# Patient Record
Sex: MALE | Race: BLACK OR AFRICAN AMERICAN | NOT HISPANIC OR LATINO | Employment: FULL TIME
[De-identification: names, ages, dates, MRNs, and addresses within clinical notes are randomized per-mention and may not be internally consistent; named-entity substitution may affect disease eponyms.]

---

## 2020-05-13 ENCOUNTER — RESULTS ONLY (OUTPATIENT)
Dept: LAB | Age: 60
End: 2020-05-13

## 2020-05-13 ENCOUNTER — APPOINTMENT (OUTPATIENT)
Dept: URGENT CARE | Facility: URGENT CARE | Age: 60
End: 2020-05-13

## 2020-05-14 LAB
SARS-COV-2 RNA SPEC QL NAA+PROBE: ABNORMAL
SPECIMEN SOURCE: ABNORMAL

## 2020-05-26 ENCOUNTER — RESULTS ONLY (OUTPATIENT)
Dept: LAB | Age: 60
End: 2020-05-26

## 2020-05-26 ENCOUNTER — APPOINTMENT (OUTPATIENT)
Dept: URGENT CARE | Facility: URGENT CARE | Age: 60
End: 2020-05-26

## 2020-05-27 ENCOUNTER — OFFICE VISIT (OUTPATIENT)
Dept: URGENT CARE | Facility: URGENT CARE | Age: 60
End: 2020-05-27
Payer: OTHER GOVERNMENT

## 2020-05-27 DIAGNOSIS — Z20.822 SUSPECTED COVID-19 VIRUS INFECTION: Primary | ICD-10-CM

## 2020-05-27 DIAGNOSIS — Z53.9 ERRONEOUS ENCOUNTER--DISREGARD: ICD-10-CM

## 2020-05-27 LAB
SARS-COV-2 RNA SPEC QL NAA+PROBE: NOT DETECTED
SPECIMEN SOURCE: NORMAL

## 2020-05-27 PROCEDURE — 87635 SARS-COV-2 COVID-19 AMP PRB: CPT | Mod: 90 | Performed by: FAMILY MEDICINE

## 2020-05-27 PROCEDURE — 99000 SPECIMEN HANDLING OFFICE-LAB: CPT | Performed by: FAMILY MEDICINE

## 2020-05-29 ENCOUNTER — TELEPHONE (OUTPATIENT)
Dept: NURSING | Facility: CLINIC | Age: 60
End: 2020-05-29

## 2020-05-29 LAB
SARS-COV-2 RNA SPEC QL NAA+PROBE: ABNORMAL
SPECIMEN SOURCE: ABNORMAL

## 2020-05-30 NOTE — TELEPHONE ENCOUNTER
Coronavirus (COVID-19) Notification    Patient  Moira Castro    Reason for call  Notify of Positive Coronavirus (COVID-19) lab results, assess symptoms,  review [facility] recommendations    Lab Result    Lab test:  2019-nCoV rRt-PCR or SARS-CoV-2 PCR    Oropharyngeal AND/OR nasopharyngeal swabs is POSITIVE for 2019-nCoV RNA/SARS-COV-2 PCR (COVID-19 virus)    RN Recommendations/Instructions per [facility] Coronavirus COVID-19 recommendations    Brief introduction script  Hi, My name is Carmen and I am calling on behalf of [facility].  We were notified that your Coronavirus test (COVID-19) for was POSITIVE for the virus.  I have some information to relay to you but first I wanted to mention that the [place] Dept of Health will be contacting you shortly [it's possible MD already called Patient] to talk to you more about how you are feeling and other people you have had contact with who might now also have the virus.  Also, [facility] is Partnering with the [facility] for Covid-19 research, you may be contacted directly by research staff.    Assessment (Inquire about Patient's current symptoms)  No symptoms. Patient was also tested positive on 5/13/20 and has been self isolating since then. Had a negative PCR on 5/26/20 and now positive PCR on 5/27/20.   Patient will contact Employee Health for further direction, is a [employer].     If at time of call, Patients symptoms hare worsened, the Patient should contact 911 or have someone drive them to Emergency Dept promptly:      If Patient calling 911, inform 911 personal that you have tested positive for the Coronavirus (COVID-19).  Place mask on and await 911 to arrive.    If Emergency Dept, If possible, please have another adult drive you to the Emergency Dept but you need to wear mask when in contact with other people.      Review information with Patient    Since you tested POSITIVE for the COVID-19 virus, it is  important that you protect others from being exposed and infected with this virus.    [For safety, it's very important to follow these rules.]    First, stay home and away from others (self-isolate) until:    You've had no fever--and no medicine that reduces fever--for 3 full days (72 hours). And      Your other symptoms have gotten better. For example, your cough or breathing has improved. And     At least 10 days have passed since your symptoms started.    [For Asymptomatic Patients] -  If Patient did not have any symptoms when tested or any symptoms since tested, they should self isolate for at least 10 days since their COVID19 PCR test date (date test collected).  New 5/27/20    During this time:    Stay in your own room (and use your own bathroom), if you can.    Stay away from others in your home. No hugging, kissing or shaking hands.    Don't let anyone visit.    Don't go to work, school or anywhere else.     Clean  high touch  surfaces often (doorknobs, counters, handles, etc.). Use a household cleaning spray or wipes.    Cover your mouth and nose with a mask, tissue or washcloth to avoid spreading germs.    Wash your hands and face often with soap and water.    You should not go back to work until you meet the guidelines above for ending your home isolation. You should meet these along with any other guidelines that your employer has.  Employers: This document serves as formal notice of your employee's medical guidelines for going back to work. They must meet the above guidelines before going back to work in person.      How can I take care of myself?  1. Get lots of rest. Drink extra fluids (unless a doctor has told you not to).    2. Take Tylenol (acetaminophen) for fever or pain. If you have liver or kidney problems, ask your family doctor if it's okay to take Tylenol.     Take either:     650 mg (two 325 mg pills) every 4 to 6 hours, or     1,000 mg (two 500 mg pills) every 8 hours as needed.     Note:  Don't take more than 3,000 mg in one day. Acetaminophen is found in many medicines (both prescribed and over-the-counter medicines). Read all labels to be sure you don't take too much.  For children, check the Tylenol bottle for the right dose. The dose is based on the child's age or weight.  3. If you have other health problems (like cancer, heart failure, an organ transplant or severe kidney disease): Call your specialty clinic if you don't feel better in the next 2 days.    4. Know when to call 911: If your breathing is so bad that it keeps you from doing normal activities, call 911 or go to the emergency room. Tell them that you've been staying home and may have COVID-19.    5. Sign up for [employer]. We know it's scary to hear that you have COVID-19. We want to track your symptoms to make sure you're okay over the next 2 weeks. Please look for an email from [employer]--this is a free, online program that we'll use to keep in touch. To sign up, follow the link in the email. Learn more at [email or web address].      Where can I get more information?    To learn the [place]'s guidelines for staying home, please visit the [place] Department of Health website at https://www.health.state.[place]/diseases/coronavirus/basics.html.    To learn more about COVID-19 and how to care for yourself at home, please visit the CDC website at https://www.cdc.gov/coronavirus/2019-ncov/about/steps-when-sick.html.    For more options for care at [facility], please visit our website at [email or web address]      [place] Dept of Health ([facility]) COVID-19 Hotline:   110.400.9778    Positive COVID-19 letter sent (Yes/No):  Yes    [Name]  Lynette Nissen RN

## 2020-05-30 NOTE — TELEPHONE ENCOUNTER
Coronavirus (COVID-19) Notification    Patient  Moira Castro    7:56 pm - no answer  8:00 pm - left generic message; Return call to [phone] between 7 am - 6:30 pm    Reason for call  Notify of Positive Coronavirus (COVID-19) lab results, assess symptoms,  review [facility] recommendations    Lab Result    Lab test:  2019-nCoV rRt-PCR or SARS-CoV-2 PCR    Oropharyngeal AND/OR nasopharyngeal swabs is POSITIVE for 2019-nCoV RNA/SARS-COV-2 PCR (COVID-19 virus)    RN Recommendations/Instructions per [facility] Coronavirus COVID-19 recommendations    Brief introduction script  Hi, My name is SIENNA Gomez and I am calling on behalf of [employer].  We were notified that your Coronavirus test (COVID-19) for was POSITIVE for the virus.  I have some information to relay to you but first I wanted to mention that the [place] Dept of Health will be contacting you shortly [it's possible MD already called Patient] to talk to you more about how you are feeling and other people you have had contact with who might now also have the virus.  Also, [facility] is Partnering with the [facility] for Covid-19 research, you may be contacted directly by research staff.    Assessment (Inquire about Patient's current symptoms)  VM left to return call    If at time of call, Patients symptoms hare worsened, the Patient should contact 911 or have someone drive them to Emergency Dept promptly:      If Patient calling 911, inform 911 personal that you have tested positive for the Coronavirus (COVID-19).  Place mask on and await 911 to arrive.    If Emergency Dept, If possible, please have another adult drive you to the Emergency Dept but you need to wear mask when in contact with other people.      Review information with Patient    Since you tested POSITIVE for the COVID-19 virus, it is important that you protect others from being exposed and infected with this virus.    [For safety, it's very important to follow  these rules.]    First, stay home and away from others (self-isolate) until:    You've had no fever--and no medicine that reduces fever--for 3 full days (72 hours). And      Your other symptoms have gotten better. For example, your cough or breathing has improved. And     At least 10 days have passed since your symptoms started.    [For Asymptomatic Patients] -  If Patient did not have any symptoms when tested or any symptoms since tested, they should self isolate for at least 10 days since their COVID19 PCR test date (date test collected).  New 5/27/20    During this time:    Stay in your own room (and use your own bathroom), if you can.    Stay away from others in your home. No hugging, kissing or shaking hands.    Don't let anyone visit.    Don't go to work, school or anywhere else.     Clean  high touch  surfaces often (doorknobs, counters, handles, etc.). Use a household cleaning spray or wipes.    Cover your mouth and nose with a mask, tissue or washcloth to avoid spreading germs.    Wash your hands and face often with soap and water.    You should not go back to work until you meet the guidelines above for ending your home isolation. You should meet these along with any other guidelines that your employer has.  Employers: This document serves as formal notice of your employee's medical guidelines for going back to work. They must meet the above guidelines before going back to work in person.      How can I take care of myself?  1. Get lots of rest. Drink extra fluids (unless a doctor has told you not to).    2. Take Tylenol (acetaminophen) for fever or pain. If you have liver or kidney problems, ask your family doctor if it's okay to take Tylenol.     Take either:     650 mg (two 325 mg pills) every 4 to 6 hours, or     1,000 mg (two 500 mg pills) every 8 hours as needed.     Note: Don't take more than 3,000 mg in one day. Acetaminophen is found in many medicines (both prescribed and over-the-counter  medicines). Read all labels to be sure you don't take too much.  For children, check the Tylenol bottle for the right dose. The dose is based on the child's age or weight.  3. If you have other health problems (like cancer, heart failure, an organ transplant or severe kidney disease): Call your specialty clinic if you don't feel better in the next 2 days.    4. Know when to call 911: If your breathing is so bad that it keeps you from doing normal activities, call 911 or go to the emergency room. Tell them that you've been staying home and may have COVID-19.    5. Sign up for [employer]. We know it's scary to hear that you have COVID-19. We want to track your symptoms to make sure you're okay over the next 2 weeks. Please look for an email from [employer]--this is a free, online program that we'll use to keep in touch. To sign up, follow the link in the email. Learn more at [email or web address].      Where can I get more information?    To learn the [place]'s guidelines for staying home, please visit the [place] Department of Health website at https://www.health.[facility].[place]/diseases/coronavirus/basics.html.    To learn more about COVID-19 and how to care for yourself at home, please visit the CDC website at https://www.cdc.gov/coronavirus/2019-ncov/about/steps-when-sick.html.    For more options for care at [facility], please visit our website at [email or web address]      [place] Dept of Health ([facility]) COVID-19 Hotline:   [phone]    Positive COVID-19 letter sent (Yes/No):  NO    [Name]  Itzel Irizarry RN  [facility]

## 2021-08-01 ENCOUNTER — OFFICE VISIT (OUTPATIENT)
Dept: URGENT CARE | Facility: URGENT CARE | Age: 61
End: 2021-08-01
Payer: OTHER GOVERNMENT

## 2021-08-01 VITALS
OXYGEN SATURATION: 100 % | DIASTOLIC BLOOD PRESSURE: 71 MMHG | TEMPERATURE: 98.4 F | HEART RATE: 75 BPM | RESPIRATION RATE: 18 BRPM | WEIGHT: 203.3 LBS | SYSTOLIC BLOOD PRESSURE: 116 MMHG

## 2021-08-01 DIAGNOSIS — R09.81 NASAL CONGESTION: ICD-10-CM

## 2021-08-01 DIAGNOSIS — J98.8 VIRAL RESPIRATORY ILLNESS: Primary | ICD-10-CM

## 2021-08-01 DIAGNOSIS — R05.9 COUGH: ICD-10-CM

## 2021-08-01 DIAGNOSIS — B97.89 VIRAL RESPIRATORY ILLNESS: Primary | ICD-10-CM

## 2021-08-01 LAB — SARS-COV-2 RNA RESP QL NAA+PROBE: NEGATIVE

## 2021-08-01 PROCEDURE — 99203 OFFICE O/P NEW LOW 30 MIN: CPT | Performed by: PHYSICIAN ASSISTANT

## 2021-08-01 PROCEDURE — U0003 INFECTIOUS AGENT DETECTION BY NUCLEIC ACID (DNA OR RNA); SEVERE ACUTE RESPIRATORY SYNDROME CORONAVIRUS 2 (SARS-COV-2) (CORONAVIRUS DISEASE [COVID-19]), AMPLIFIED PROBE TECHNIQUE, MAKING USE OF HIGH THROUGHPUT TECHNOLOGIES AS DESCRIBED BY CMS-2020-01-R: HCPCS | Performed by: PHYSICIAN ASSISTANT

## 2021-08-01 PROCEDURE — U0005 INFEC AGEN DETEC AMPLI PROBE: HCPCS | Performed by: PHYSICIAN ASSISTANT

## 2021-08-01 RX ORDER — CETIRIZINE HYDROCHLORIDE 10 MG/1
10 TABLET ORAL DAILY
Qty: 30 TABLET | Refills: 0 | Status: SHIPPED | OUTPATIENT
Start: 2021-08-01 | End: 2021-08-11

## 2021-08-01 NOTE — PROGRESS NOTES
Differential Diagnosis:  URI Adult/Peds:  Acute right otitis media, Acute left otitis media, Allergic rhinitis, Sinusitis and Viral upper respiratory illness          ASSESSMENT:     ICD-10-CM    1. Viral respiratory illness  J98.8     B97.89    2. Cough  R05 cetirizine (ZYRTEC) 10 MG tablet     Symptomatic COVID-19 Virus (Coronavirus) by PCR Nasopharyngeal   3. Nasal congestion  R09.81 cetirizine (ZYRTEC) 10 MG tablet     Symptomatic COVID-19 Virus (Coronavirus) by PCR Nasopharyngeal           PLAN: Likely viral respiratory illness.  Covid test is pending with increase of delta variant.  Also symptoms could be due to poor air quality from the [place] fires.  Zyrtec.  Off work today and tomorrow and may return Tuesday if Covid test is negative and afebrile.   I have discussed clinical findings with patient.  Side effects of medications discussed.  Symptomatic care is discussed.  I have discussed the possibility of  worsening symptoms and indication to RTC or go to the ER if they occur.  All questions are answered, patient indicates understanding of these issues and is in agreement with plan.   Patient care instructions are discussed/given at the end of visit.   Lots of rest and fluids.      Brandi Hall PA-C      SUBJECTIVE:  60-year-old male presents for cough, nasal congestion eye tearing for 3 days.  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  States his axillary temp last night at home was 104.  Has not had any ibuprofen for over 24 hours.  No vomiting or diarrhea.  No rash.  Needs a note to be off work today and tomorrow and will return on Tuesday.    Positive covid 5/27/2020  Covid vacc 3/2021          ROS:  CONSTITUTIONAL: Negative for fatigue or fever.  EYES: Negative for eye problems.  ENT: As above.  RESP: As above.  CV: Negative for chest pains.  GI: Negative for vomiting.  MUSCULOSKELETAL:  Negative for significant muscle or joint pains.  NEUROLOGIC:  Negative for headaches.  SKIN: Negative for rash.  PSYCH: Normal mentation for age.    OBJECTIVE:  /71   Pulse 75   Temp 98.4  F (36.9  C) (Oral)   Resp 18   Wt 92.2 kg (203 lb 4.8 oz)   SpO2 100%   GENERAL APPEARANCE: Healthy, alert and no distress.  EYES:Conjunctiva/sclera clear.  EARS: No cerumen.   Ear canals w/o erythema.  TM's intact w/o erythema.    NOSE/MOUTH: Nose without ulcers, erythema or lesions.  SINUSES: No maxillary sinus tenderness.  THROAT: No erythema w/o tonsillar enlargement . No exudates.  NECK: Supple, nontender, no lymphadenopathy.  RESP: Lungs clear to auscultation - no rales, rhonchi or wheezes  CV: Regular rate and rhythm, normal S1 S2, no murmur noted.  NEURO: Awake, alert    SKIN: No rashes        Brandi Hall PA-C

## 2021-08-01 NOTE — LETTER
[facility] [address] [address]  Phone: [phone]    August 1, 2021        Moira Castro  [address] [address]          To whom it may concern:    RE: Moira Castro    Patient was seen and treated today at our clinic. Off work 8/1 and 8/2/2021. May return to work if no fever and covid test is negative on 8/3/2021.    Please contact me for questions or concerns.      Sincerely,        Brandi Hall PA-C

## 2021-08-03 ENCOUNTER — TELEPHONE (OUTPATIENT)
Dept: FAMILY MEDICINE | Facility: CLINIC | Age: 61
End: 2021-08-03

## 2021-08-03 NOTE — TELEPHONE ENCOUNTER
: Pt requesting a copy of the 8/2/21 letter. Pt states he has not received the letter yet and would like to  from the [facility]  today    Pt requesting 8/1/21 COVID test results. RN relayed 8/2/21 provider result message to pt. RN reviewed the 8/2/21 letter with pt and pt states he will give the letter to his provider to determine when he can return to work. Pt confirms he still feels like he has chills today and is aware he should continue to isolate until fever and chills free for 24 hours and meets other criteria as outlined in the letter.      Nita Weller CMA   8/3/2021 10:01 AM CDT Back to Top      Phone number not in service. Letter was sent yesterday.     Brandi Hall PA-C   8/2/2021  7:28 AM CDT       Let him know his covid test is negative so he can go back to work on Tuesday.  Please notify.     RIMA PepperN, RN

## 2021-08-03 NOTE — TELEPHONE ENCOUNTER
"Printed letter and bringing to the  by 3:30 today, 8/3/2021.  Called and Patient # \"Not in Service\"  [facility]  2nd Floor  Primary Care    "

## 2021-09-13 ENCOUNTER — VIRTUAL VISIT (OUTPATIENT)
Dept: FAMILY MEDICINE | Facility: CLINIC | Age: 61
End: 2021-09-13

## 2021-09-13 DIAGNOSIS — R76.11 MANTOUX: POSITIVE: Primary | ICD-10-CM

## 2021-09-13 PROCEDURE — 99213 OFFICE O/P EST LOW 20 MIN: CPT | Mod: 95 | Performed by: INTERNAL MEDICINE

## 2021-09-13 NOTE — PATIENT INSTRUCTIONS
Patient Education     Tuberculosis Testing    Tuberculosis (TB) is a bacterial disease that spreads through the air. A person with TB of the throat or lungs who coughs, speaks, or sings can unknowingly spread the bacteria into the air. Uninfected people nearby can breathe in the TB bacteria and be infected.  TB can cause serious health problems. To protect your health, get tested.  Important  If you have active TB disease or simply test positive for TB infection, you must see a healthcare provider for an exam and treatment.   Who should be tested?  Anyone can be exposed to TB. But healthcare providers, homeless people, and people coming from countries with high TB rates are more likely to be exposed to it. Older adults and people with HIV and AIDS are less able to fight off infections. They are also more likely to get TB. If you re at risk for exposure, get tested regularly.  TB tests    Skin test. The TB skin test tells you if the TB bacteria are in your body. A small amount of solution is put under the skin with a needle. This is done to see if a reaction occurs, such as a hard, red bump.    Blood test. A small amount of blood is drawn and sent to a lab for testing.    Other tests. If you are being evaluated for active TB, a sample may be taken of the mucus that comes up when you cough (sputum). Or some other tissue or body fluid sample may be taken. It is then tested for TB.  Getting your TB test results  After the placement of a TB skin test, 2 to 3 days later you ll need to return to the office to get the test read. Be sure to keep this appointment. In some cases, a second test may be done to confirm results. If you have a blood test, the results are often available in a week. Samples of sputum and other body tissues and fluids can take up to 6 weeks for final results.  What do the test results mean    Negative results. These mean you likely don t have the TB bacteria in your body. But in people with some  chronic illnesses, the TB tests can be negative even if there is TB in the body.    Positive results. These mean that you may have been infected with the TB bacteria. This doesn t necessarily mean you have active TB disease. You will need more tests, such as chest X-rays, to find out if you have active TB disease. If you don't have active TB, then you are said to have latent TB infection.   [employer] last reviewed this educational content on 6/1/2019 2000-2021 [employer]. All rights reserved. This information is not intended as a substitute for professional medical care. Always follow your healthcare professional's instructions.

## 2021-09-13 NOTE — PROGRESS NOTES
Moira is a 61 year old who is being evaluated via a billable telephone visit.      What phone number would you like to be contacted at? [phone]  How would you like to obtain your AVS? 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    Toni Ulrich MD  [facility]   Moira is a 61 year old who presents for the following health issues     HPI   He has a history of having positive Mantoux test in the past  He is going for a new job  Needs to have a chest x-ray before starting the job          Review of Systems   Constitutional, HEENT, cardiovascular, pulmonary, gi and gu systems are negative, except as otherwise noted.      Objective           Vitals:  No vitals were obtained today due to virtual visit.    Physical Exam   healthy, alert and no distress  PSYCH: Alert and oriented times 3; coherent speech, normal   rate and volume, able to articulate logical thoughts, able   to abstract reason, no tangential thoughts, no hallucinations   or delusions  His affect is normal  RESP: No cough, no audible wheezing, able to talk in full sentences  Remainder of exam unable to be completed due to telephone visits                Phone call duration: 5 minutes

## 2021-09-14 ENCOUNTER — ANCILLARY PROCEDURE (OUTPATIENT)
Dept: GENERAL RADIOLOGY | Facility: CLINIC | Age: 61
End: 2021-09-14
Attending: INTERNAL MEDICINE

## 2021-09-14 DIAGNOSIS — R76.11 MANTOUX: POSITIVE: ICD-10-CM

## 2021-09-14 PROCEDURE — 71046 X-RAY EXAM CHEST 2 VIEWS: CPT | Performed by: RADIOLOGY

## 2021-09-14 NOTE — LETTER
September 16, 2021      Moira Castro  [address] [address]      Mr. Castro,     Your Chest Xray is normal     Please contact the clinic if you have additional questions.  Thank you.     Sincerely,     Toni Ulrich MD         Resulted Orders   XR Chest 2 Views    Narrative    CHEST TWO VIEWS  9/14/2021 7:07 AM     HISTORY: 61-year-old patient with Mantoux positive test.    COMPARISON: None.       Impression    IMPRESSION: Heart size is normal. No pleural effusion, pneumothorax,  or abnormal area of consolidation.    RUSS HARRIS MD         SYSTEM ID:  [id number]

## 2022-05-25 ENCOUNTER — OFFICE VISIT (OUTPATIENT)
Dept: URGENT CARE | Facility: URGENT CARE | Age: 62
End: 2022-05-25

## 2022-05-25 VITALS
DIASTOLIC BLOOD PRESSURE: 79 MMHG | SYSTOLIC BLOOD PRESSURE: 144 MMHG | BODY MASS INDEX: 28.77 KG/M2 | HEART RATE: 87 BPM | OXYGEN SATURATION: 100 % | WEIGHT: 201 LBS | TEMPERATURE: 97.6 F | HEIGHT: 70 IN

## 2022-05-25 DIAGNOSIS — N52.9 ERECTILE DYSFUNCTION, UNSPECIFIED ERECTILE DYSFUNCTION TYPE: Primary | ICD-10-CM

## 2022-05-25 PROCEDURE — 99213 OFFICE O/P EST LOW 20 MIN: CPT | Performed by: NURSE PRACTITIONER

## 2022-05-25 NOTE — PATIENT INSTRUCTIONS
Please follow up with primary care for a physical and to establish care please ask for a longer appointment (40 minutes) and go to that appointment fasting (no food for 12 hours and can have water or black coffee)

## 2022-05-25 NOTE — PROGRESS NOTES
"Assessment & Plan     1. Erectile dysfunction, unspecified erectile dysfunction type  Discussed with patient recommendation to follow-up with primary care provider he will need further work-up in regards to causes of erectile dysfunction discussed cardiovascular work-up would be needed as well as possible testosterone level checkup and mental health exam patient verbalized understanding he will set up an appointment as he leaves clinic today for physical and establish care.    Patient Instructions   Please follow up with primary care for a physical and to establish care please ask for a longer appointment (40 minutes) and go to that appointment fasting (no food for 12 hours and can have water or black coffee)       MAGALY Stahl [facility] CARE JOSHUA Pizarro is a 61 year old male who presents to clinic today for the following health issues:  Chief Complaint   Patient presents with     Erectile Dysfunction     HPI    Patient with approximately 2-month history of erectile dysfunction with mild elevation of blood pressure today.  Patient states that he has negative history of cardiovascular disease however there is no lab work-up in epic unable to go over this with him.  He states he has been  for many years and feels that his libido has been low.      Review of Systems  Constitutional, HEENT, cardiovascular, pulmonary, gi and gu systems are negative, except as otherwise noted.      Objective    BP (!) 144/79 (BP Location: Left arm, Patient Position: Sitting, Cuff Size: Adult Regular)   Pulse 87   Temp 97.6  F (36.4  C) (Tympanic)   Ht 1.778 m (5' 10\")   Wt 91.2 kg (201 lb)   SpO2 100%   BMI 28.84 kg/m    Physical Exam   GENERAL: healthy, alert and no distress  NECK: no adenopathy, no asymmetry, masses, or scars and thyroid normal to palpation  RESP: lungs clear to auscultation - no rales, rhonchi or wheezes  CV: regular rate and rhythm, normal S1 S2, " no S3 or S4, no murmur, click or rub, no peripheral edema and peripheral pulses strong  ABDOMEN: soft, nontender, no hepatosplenomegaly, no masses and bowel sounds normal  MS: no gross musculoskeletal defects noted, no edema

## 2022-08-14 ENCOUNTER — OFFICE VISIT (OUTPATIENT)
Dept: URGENT CARE | Facility: URGENT CARE | Age: 62
End: 2022-08-14

## 2022-08-14 VITALS
OXYGEN SATURATION: 99 % | HEART RATE: 95 BPM | BODY MASS INDEX: 29.06 KG/M2 | TEMPERATURE: 97.3 F | WEIGHT: 203 LBS | DIASTOLIC BLOOD PRESSURE: 78 MMHG | HEIGHT: 70 IN | SYSTOLIC BLOOD PRESSURE: 120 MMHG

## 2022-08-14 DIAGNOSIS — Z20.822 SUSPECTED 2019 NOVEL CORONAVIRUS INFECTION: ICD-10-CM

## 2022-08-14 DIAGNOSIS — J02.9 SORE THROAT: Primary | ICD-10-CM

## 2022-08-14 DIAGNOSIS — R05.9 COUGH: ICD-10-CM

## 2022-08-14 LAB
DEPRECATED S PYO AG THROAT QL EIA: NEGATIVE
GROUP A STREP BY PCR: NOT DETECTED
SARS-COV-2 RNA RESP QL NAA+PROBE: POSITIVE

## 2022-08-14 PROCEDURE — U0005 INFEC AGEN DETEC AMPLI PROBE: HCPCS | Performed by: PHYSICIAN ASSISTANT

## 2022-08-14 PROCEDURE — 87651 STREP A DNA AMP PROBE: CPT | Performed by: PHYSICIAN ASSISTANT

## 2022-08-14 PROCEDURE — U0003 INFECTIOUS AGENT DETECTION BY NUCLEIC ACID (DNA OR RNA); SEVERE ACUTE RESPIRATORY SYNDROME CORONAVIRUS 2 (SARS-COV-2) (CORONAVIRUS DISEASE [COVID-19]), AMPLIFIED PROBE TECHNIQUE, MAKING USE OF HIGH THROUGHPUT TECHNOLOGIES AS DESCRIBED BY CMS-2020-01-R: HCPCS | Performed by: PHYSICIAN ASSISTANT

## 2022-08-14 PROCEDURE — 99214 OFFICE O/P EST MOD 30 MIN: CPT | Mod: CS | Performed by: PHYSICIAN ASSISTANT

## 2022-08-14 RX ORDER — IBUPROFEN 200 MG
200 TABLET ORAL EVERY 4 HOURS PRN
COMMUNITY

## 2022-08-14 RX ORDER — NAPROXEN 500 MG/1
500 TABLET ORAL 2 TIMES DAILY WITH MEALS
Qty: 20 TABLET | Refills: 0 | Status: SHIPPED | OUTPATIENT
Start: 2022-08-14 | End: 2022-08-24

## 2022-08-14 RX ORDER — BENZONATATE 100 MG/1
100 CAPSULE ORAL 3 TIMES DAILY PRN
Qty: 30 CAPSULE | Refills: 0 | Status: SHIPPED | OUTPATIENT
Start: 2022-08-14 | End: 2022-08-24

## 2022-08-14 NOTE — PROGRESS NOTES
Chief Complaint   Patient presents with     Pharyngitis     Cough       Results for orders placed or performed in visit on 08/14/22   Streptococcus A Rapid Screen w/Reflex to PCR - Clinic Collect     Status: Normal    Specimen: Throat; Swab   Result Value Ref Range    Group A Strep antigen Negative Negative               ASSESSMENT:     ICD-10-CM    1. Sore throat  J02.9 ibuprofen (ADVIL/MOTRIN) 200 MG tablet     Streptococcus A Rapid Screen w/Reflex to PCR - Clinic Collect     Symptomatic; Unknown COVID-19 Virus (Coronavirus) by PCR Nose     Group A Streptococcus PCR Throat Swab     benzonatate (TESSALON) 100 MG capsule     naproxen (NAPROSYN) 500 MG tablet   2. Cough  R05.9 ibuprofen (ADVIL/MOTRIN) 200 MG tablet     Streptococcus A Rapid Screen w/Reflex to PCR - Clinic Collect     Symptomatic; Unknown COVID-19 Virus (Coronavirus) by PCR Nose     Group A Streptococcus PCR Throat Swab     benzonatate (TESSALON) 100 MG capsule     naproxen (NAPROSYN) 500 MG tablet   3. Suspected 2019 novel coronavirus infection  Z20.822 ibuprofen (ADVIL/MOTRIN) 200 MG tablet     Streptococcus A Rapid Screen w/Reflex to PCR - Clinic Collect     Symptomatic; Unknown COVID-19 Virus (Coronavirus) by PCR Nose     Group A Streptococcus PCR Throat Swab     benzonatate (TESSALON) 100 MG capsule     naproxen (NAPROSYN) 500 MG tablet           PLAN: Vital signs stable.  Tessalon Perles.  Naproxen.  Further strep testing COVID test pending.  I have discussed clinical findings with patient.  Side effects of medications discussed.  Symptomatic care is discussed.  I have discussed the possibility of  worsening symptoms and indication to RTC or go to the ER if they occur.  All questions are answered, patient indicates understanding of these issues and is in agreement with plan.   Patient care instructions are discussed/given at the end of visit.   Lots of rest and fluids.      Brandi Hall PA-C      SUBJECTIVE:  61-year-old male presents  "for evaluation of sore throat and dry cough for 3 days.  Sore throat bothers him the most.  No fever.  No history of asthma or seasonal allergies.  No vomiting or diarrhea.  covid vacc x3    No Known Allergies    No past medical history on file.    ibuprofen (ADVIL/MOTRIN) 200 MG tablet, Take 200 mg by mouth every 4 hours as needed for mild pain    No current facility-administered medications on file prior to visit.      Social History     Tobacco Use     Smoking status: Never Smoker     Smokeless tobacco: Never Used   Substance Use Topics     Alcohol use: Yes       ROS:  CONSTITUTIONAL: Negative for fatigue or fever.  EYES: Negative for eye problems.  ENT: As above.  RESP: As above.  CV: Negative for chest pains.  GI: Negative for vomiting.  MUSCULOSKELETAL:  Negative for significant muscle or joint pains.  NEUROLOGIC: Negative for headaches.  SKIN: Negative for rash.  PSYCH: Normal mentation for age.    OBJECTIVE:  /78 (BP Location: Left arm, Patient Position: Sitting, Cuff Size: Adult Large)   Pulse 95   Temp 97.3  F (36.3  C) (Tympanic)   Ht 1.778 m (5' 10\")   Wt 92.1 kg (203 lb)   SpO2 99%   BMI 29.13 kg/m    GENERAL APPEARANCE: Healthy, alert and no distress.  EYES:Conjunctiva/sclera clear.  EARS: No cerumen.   Ear canals w/o erythema.  TM's intact w/o erythema.    NOSE/MOUTH: Nose without ulcers, erythema or lesions.  SINUSES: No maxillary sinus tenderness.  THROAT: Moderate erythema w/o tonsillar enlargement . No exudates.  NECK: Supple, nontender, no lymphadenopathy.  RESP: Lungs clear to auscultation - no rales, rhonchi or wheezes.  Uvula midline.  CV: Regular rate and rhythm, normal S1 S2, no murmur noted.  NEURO: Awake, alert    SKIN: No rashes        Brandi Hall PA-C    "

## 2022-08-15 ENCOUNTER — NURSE TRIAGE (OUTPATIENT)
Dept: NURSING | Facility: CLINIC | Age: 62
End: 2022-08-15

## 2022-08-15 ENCOUNTER — TELEPHONE (OUTPATIENT)
Dept: NURSING | Facility: CLINIC | Age: 62
End: 2022-08-15

## 2022-08-15 NOTE — TELEPHONE ENCOUNTER
Patient returned call to [facility] as was seen in this office for UC yesterday and where had test performed.  Patient asking for COVID results.    Writer relayed to patient that result is POSITIVE.  Discussed home care and isolation/quaratine guidelines. Reviewed current CDC guidelines with patient.  Recommended patient schedule treatment visit ASAP as is symptomatic and still within prescription window.  Patient agreed with this plan, scheduling number given ([phone], opt 7)  Not experiencing severe or concerning symptoms at this time.  Reviewed symptoms that warrant going to ED immediately or calling 911.      Miriam Howell RN  [facility]  Primary Care Department

## 2022-08-15 NOTE — TELEPHONE ENCOUNTER
Patient classified as COVID treatment eligible by Epic high risk algorithm:  yes    Coronavirus (COVID-19) Notification    Reason for call  Notify of POSITIVE COVID-19 lab result, assess symptoms,  review [facility] recommendations    Lab Result   Lab test for 2019-nCoV rRt-PCR or SARS-COV-2 PCR  Oropharyngeal AND/OR nasopharyngeal swabs were POSITIVE for 2019-nCoV RNA [OR] SARS-COV-2 RNA (COVID-19) RNA     We have been unable to reach patient by phone at this time to notify of their Positive COVID-19 result.    Left voicemail message requesting a call back to [phone] [facility] for results.        A Positive COVID-19 letter will be sent via [employer] or the mail.    Philomena Nina

## 2022-08-15 NOTE — TELEPHONE ENCOUNTER
"RN Triage:  Patient was seen in  8/14/22  COVID-19 positive.  Pt was advised to contact [facility] to scheduled treatment today.  Pt was not experiencing severe or concerning symptoms at this time per RN Felicia Soto.      Coronavirus (COVID-19) Notification    Moira Castro,      Lab Result    Lab test:  2019-nCoV rRt-PCR or SARS-CoV-2 PCR    Oropharyngeal AND/OR nasopharyngeal swabs is POSITIVE for 2019-nCoV RNA/SARS-COV-2 PCR (COVID-19 virus)    Brief introduction script  Introduce self then review script:  \"I am calling on behalf of [facility].  We were notified that your Coronavirus test (COVID-19) for was POSITIVE for the virus.\"    Gather patient reported symptoms   Assessment   Current Symptoms at time of phone call, reported by patient: (if no symptoms, document No symptoms] Runny nose and congestion.  Sore throat.  Fever resolved presently.   Date of Symptom(s) onset (if applicable) 8/12/22     If at time of call, Patients symptoms hare worsened, the Patient should contact 911 or have someone drive them to Emergency Dept promptly:      If Patient calling 911, inform 911 personal that you have tested positive for the Coronavirus (COVID-19).  Place mask on and await 911 to arrive.    If Emergency Dept, If possible, please have another adult drive you to the Emergency Dept but you need to wear mask when in contact with other people.      Monoclonal Antibody Administration    You may be eligible to receive a new treatment with a monoclonal antibody for preventing hospitalization in patients at high risk for complications from COVID-19. This medication is still experimental and available on a limited basis; it is given through an IV and must be given at an infusion center. Please note that not all people who are eligible will receive the medication since it is in limited supply.  Is the patient symptomatic and onset of symptoms within the last 7 days?  Yes  Is the patient interested in a visit " with a provider to discuss treatment options?: Yes  Is the patient seen at [facility]?  No: Warm transfer caller to [phone] to be scheduled with a virtual urgent provider.  During transfer, instruct  on appropriate time frame for visit     Review information with Patient    Your result was positive. This means you have COVID-19 (coronavirus).      How can I protect others?    These guidelines are for isolating before returning to work, school or .       If you DO have symptoms:  o Stay home and away from others  - For at least 5 days after your symptoms started, AND   - You are fever free for 24 hours (with no medicine that reduces fever), AND  - Your other symptoms are better.  o Wear a mask for 10 full days any time you are around others.    If you DON'T have symptoms:  o Stay at home and away from others for at least 5 days after your positive test.  o Wear a mask for 10 full days any time you are around others.    There may be different guidelines for healthcare facilities. Please check with the specific sites before arriving.     If you've been told by a doctor that you were severely ill with COVID-19 or are immunocompromised, you should isolate for at least 10 days.    You should not go back to work until you meet the guidelines above for ending your home isolation. You don't need to be retested for COVID-19 before going back to work--studies show that you won't spread the virus if it's been at least 10 days since your symptoms started (or 20 days, if you have a weak immune system).    Employers, schools, and daycares: This is an official notice for this person's medical guidelines for returning in-person. They must meet the above guidelines before going back to work, school, or  in person.    You will receive a positive COVID-19 letter via [employer] or the mail soon with additional self-care information.      Would you like me to review some of that information with you  now?  Yes    How can I take care of myself?      Get lots of rest. Drink extra fluids (unless a doctor has told you not to).      Take Tylenol (acetaminophen) for fever or pain. If you have liver or kidney problems, ask your family doctor if it's okay to take Tylenol.     Take either:     650 mg (two 325 mg pills) every 4 to 6 hours, or     1,000 mg (two 500 mg pills) every 8 hours as needed.     Note: Do not take more than 3,000 mg in one day. Acetaminophen is found in many medicines (both prescribed and over-the-counter medicines). Read all labels to be sure you don't take too much.    For children, check the Tylenol bottle for the right dose (based on their age or weight).      If you have other health problems (like cancer, heart failure, an organ transplant or severe kidney disease): Call your specialty clinic if you don't feel better in the next 2 days.      Know when to call 911: Emergency warning signs include:    Trouble breathing or shortness of breath    Pain or pressure in the chest that doesn't go away    Feeling confused like you haven't felt before, or not being able to wake up    Bluish-colored lips or face        If you were tested for an upcoming procedure, please contact your provider for next steps.     Pat Rodas RN

## 2022-08-16 ENCOUNTER — VIRTUAL VISIT (OUTPATIENT)
Dept: URGENT CARE | Facility: CLINIC | Age: 62
End: 2022-08-16

## 2022-08-16 DIAGNOSIS — R05.9 COUGH: Primary | ICD-10-CM

## 2022-08-16 DIAGNOSIS — U07.1 COVID: ICD-10-CM

## 2022-08-16 PROCEDURE — 99212 OFFICE O/P EST SF 10 MIN: CPT | Mod: CS | Performed by: EMERGENCY MEDICINE

## 2022-08-16 NOTE — PROGRESS NOTES
Video visit:  Time: 10 minutes    CHIEF COMPLAINT: COVID infection.      HPI: Patient is a 61-year-old male who is on day 5 of COVID infection.  Symptoms have been rhinorrhea, sore throat, feeling feverish, productive cough.  He feels much better today versus several days ago.  Home pulse oximetry was 98%.    Patient has no chronic medical conditions and takes no daily prescription medications.      ROS: No shortness of breath.    No Known Allergies   Current Outpatient Medications   Medication Sig Dispense Refill     benzonatate (TESSALON) 100 MG capsule Take 1 capsule (100 mg) by mouth 3 times daily as needed for cough 30 capsule 0     ibuprofen (ADVIL/MOTRIN) 200 MG tablet Take 200 mg by mouth every 4 hours as needed for mild pain       naproxen (NAPROSYN) 500 MG tablet Take 1 tablet (500 mg) by mouth 2 times daily (with meals) for 10 days for pain 20 tablet 0         PE: No acute distress on phone appointment.  Patient is alert and oriented.  Nondyspneic sounding speaking in full sentences.        TREATMENT: None.      ASSESSMENT: Day 5 COVID infection with symptoms resolving.  No comorbidities regarding consideration for antiviral medications.  No antiviral medications recommended.      DIAGNOSIS: COVID infection.      PLAN: Symptomatic medications only.  Follow-up if any worsening symptoms or concerns.

## 2023-02-19 ENCOUNTER — OFFICE VISIT (OUTPATIENT)
Dept: URGENT CARE | Facility: URGENT CARE | Age: 63
End: 2023-02-19

## 2023-02-19 VITALS
TEMPERATURE: 97.7 F | WEIGHT: 204.1 LBS | HEART RATE: 67 BPM | BODY MASS INDEX: 29.29 KG/M2 | OXYGEN SATURATION: 100 % | DIASTOLIC BLOOD PRESSURE: 85 MMHG | SYSTOLIC BLOOD PRESSURE: 132 MMHG | RESPIRATION RATE: 20 BRPM

## 2023-02-19 DIAGNOSIS — R10.11 RUQ ABDOMINAL PAIN: Primary | ICD-10-CM

## 2023-02-19 DIAGNOSIS — R10.31 RLQ ABDOMINAL PAIN: ICD-10-CM

## 2023-02-19 DIAGNOSIS — R10.9 RIGHT FLANK PAIN: ICD-10-CM

## 2023-02-19 DIAGNOSIS — R73.03 PREDIABETES: ICD-10-CM

## 2023-02-19 PROCEDURE — 99214 OFFICE O/P EST MOD 30 MIN: CPT | Performed by: NURSE PRACTITIONER

## 2023-02-19 NOTE — PROGRESS NOTES
Assessment & Plan     1. RUQ abdominal pain      2. RLQ abdominal pain      3. Right flank pain      4. Prediabetes    Due to patient's presentation of pain and location concern for issues related to possible cholecystitis, hepatitis, pancreatitis, or appendicitis.  Other differential diagnosis would include renal calculi.  Urinary tract infection.  Would recommend patient have further work-up through higher acuity location and is willing to go through emergency department.  He states he will bring himself to [facility] which is where he normally is seen per patient.  He will have family member meet him there.  Discussed risks if patient did not go in including ruptured appendicitis or gallbladder or risks with prediabetes and pancreatitis patient verbalized understanding of these risks and again states that he will present himself to emergency department through [facility].      MAGALY Stahl [facility]    Jim Pizarro is a 62 year old male who presents to clinic today for the following health issues:  Chief Complaint   Patient presents with     Abdominal Pain     X2 WKS     HPI    Patient presents to clinic with right side abdominal pain that is radiating to back and is worse with food. He has history of prediabetes. Denies changes to bowel or bladder. Denies fever.   Patient states symptoms have been worsening states when he eats he feels more bloated and pain is now radiating down into the right groin area.    Review of Systems  Constitutional, HEENT, cardiovascular, pulmonary, gi and gu systems are negative, except as otherwise noted.      Objective    /85   Pulse 67   Temp 97.7  F (36.5  C) (Tympanic)   Resp 20   Wt 92.6 kg (204 lb 1.6 oz)   SpO2 100%   BMI 29.29 kg/m    Physical Exam   GENERAL: alert, no distress and over weight  EYES: Eyes grossly normal to inspection, PERRL and conjunctivae and sclerae normal  HENT: ear canals and TM's normal, nose  and mouth without ulcers or lesions  NECK: no adenopathy, no asymmetry, masses, or scars and thyroid normal to palpation  RESP: lungs clear to auscultation - no rales, rhonchi or wheezes  CV: regular rate and rhythm, normal S1 S2, no S3 or S4, no murmur, click or rub, no peripheral edema and peripheral pulses strong  ABDOMEN: Large abdomen slightly distended but soft.  Hypobowel sounds. Tenderness RUQ, RLQ and right flank area, no organomegaly or masses, liver span normal to percussion, bowel sounds normal, no palpable or pulsatile masses, no bruits heard and no palpable renal abnormalities   MS: no gross musculoskeletal defects noted, no edema  SKIN: no suspicious lesions or rashes  NEURO: Normal strength and tone, mentation intact and speech normal

## 2023-02-19 NOTE — PATIENT INSTRUCTIONS
Discussed in detail symptoms that would warrant emergent evaluation in the ED. Patient agrees with plan and will follow up as needed.       0 Result Notes    Component Ref Range & Units 6 mo ago 1 yr ago    SARS CoV2 PCR Negative Positive Abnormal   Negative CM    Comment: POSITIVE: SARS-CoV-2 (COVID-19) RNA detected, presumed positive.   Resulting Agency  [facility]           Narrative  Performed by: [facility]  Testing was performed using the Xpert Xpress SARS-CoV-2 Assay on the   Cepheid Gene-Xpert Instrument Systems. Additional information about   this Emergency Use Authorization (EUA) assay can be found via the Lab   Guide. This test should be ordered for the detection of SARS-CoV-2 in   individuals who meet SARS-CoV-2 clinical and/or epidemiological   criteria. Test performance is unknown in asymptomatic patients. This   test is for in vitro diagnostic use under the FDA EUA for   laboratories certified under CLIA to perform high complexity testing.   This test has not been FDA cleared or approved. A negative result   does not rule out the presence of PCR inhibitors in the specimen or   target RNA in concentration below the limit of detection for the   assay. The possibility of a false negative should be considered if   the patient's recent exposure or clinical presentation suggests   COVID-19. This test was validated by the [facility]. This laboratory is certified under   the Clinical Laboratory Improvement Amendments of 1988 (CLIA-88) as   qualified to perform high complexity laboratory testing.       Specimen Collected: 08/14/22  2:51 PM Last Resulted: 08/14/22  5:52 PM

## 2025-02-18 ENCOUNTER — OFFICE VISIT (OUTPATIENT)
Dept: URGENT CARE | Facility: URGENT CARE | Age: 65
End: 2025-02-18

## 2025-02-18 VITALS
DIASTOLIC BLOOD PRESSURE: 79 MMHG | SYSTOLIC BLOOD PRESSURE: 184 MMHG | TEMPERATURE: 99.1 F | WEIGHT: 200.2 LBS | RESPIRATION RATE: 18 BRPM | BODY MASS INDEX: 28.73 KG/M2 | HEART RATE: 87 BPM | OXYGEN SATURATION: 100 %

## 2025-02-18 DIAGNOSIS — R03.0 ELEVATED BLOOD PRESSURE READING WITHOUT DIAGNOSIS OF HYPERTENSION: ICD-10-CM

## 2025-02-18 DIAGNOSIS — U07.1 INFECTION DUE TO 2019 NOVEL CORONAVIRUS: ICD-10-CM

## 2025-02-18 DIAGNOSIS — R50.9 FEVER, UNSPECIFIED FEVER CAUSE: Primary | ICD-10-CM

## 2025-02-18 DIAGNOSIS — S05.02XA ABRASION OF LEFT CORNEA, INITIAL ENCOUNTER: ICD-10-CM

## 2025-02-18 LAB
FLUAV AG SPEC QL IA: NEGATIVE
FLUBV AG SPEC QL IA: NEGATIVE

## 2025-02-18 PROCEDURE — 99214 OFFICE O/P EST MOD 30 MIN: CPT | Performed by: PHYSICIAN ASSISTANT

## 2025-02-18 PROCEDURE — 87804 INFLUENZA ASSAY W/OPTIC: CPT | Performed by: PHYSICIAN ASSISTANT

## 2025-02-18 RX ORDER — POLYMYXIN B SULFATE AND TRIMETHOPRIM 1; 10000 MG/ML; [USP'U]/ML
1 SOLUTION OPHTHALMIC EVERY 6 HOURS
Qty: 2 ML | Refills: 0 | Status: SHIPPED | OUTPATIENT
Start: 2025-02-18 | End: 2025-02-25

## 2025-02-18 ASSESSMENT — PAIN SCALES - GENERAL: PAINLEVEL_OUTOF10: MODERATE PAIN (6)

## 2025-02-18 NOTE — PATIENT INSTRUCTIONS
[facility] if not better 48-72 hours   Vitals: WNL  Gen: AAOx3, NAD, sitting comfortably in stretcher  Head: ncat, perrla, eomi b/l, no photophobia  Neck: supple, no lymphadenopathy, no midline deviation  Heart: rrr, no m/r/g  Lungs: CTA b/l, no rales/ronchi/wheezes  Abd: soft, nontender, non-distended, no rebound or guarding  Ext: no clubbing/cyanosis/edema  Neuro: sensation and muscle strength intact b/l, steady gait, no focal weakness, no CN deficits

## 2025-02-18 NOTE — LETTER
February 18, 2025      Moira Castro  [address] [place] [address]        To Whom It May Concern:    Brownsolomon CASTAÑEDA Matthew  was seen on 2/18/2025.  Please excuse him from until 2/24/2025.         Sincerely,        Brandi Hall PA-C    Electronically signed

## 2025-02-18 NOTE — PROGRESS NOTES
Chief Complaint   Patient presents with    Fever     Fever started yesterday, fatigue, body aches and chills    Cough     Wet cough started yesterday     Headache     Headache started yesterday     Eye Problem     Feels like there is sand in left eye      Results for orders placed or performed in visit on 02/18/25   Influenza A & B Antigen - Clinic Collect     Status: Normal    Specimen: Nose; Swab   Result Value Ref Range    Influenza A antigen Negative Negative    Influenza B antigen Negative Negative    Narrative    Test results must be correlated with clinical data. If necessary, results should be confirmed by a molecular assay or viral culture.           ASSESSMENT:     ICD-10-CM    1. Fever, unspecified fever cause  R50.9 nirmatrelvir and ritonavir (PAXLOVID) 300 mg/100 mg therapy pack      2. Infection due to 2019 novel coronavirus  U07.1 nirmatrelvir and ritonavir (PAXLOVID) 300 mg/100 mg therapy pack      3. Abrasion of left cornea, initial encounter  S05.02XA polymixin b-trimethoprim (POLYTRIM) 17076-0.1 UNIT/ML-% ophthalmic solution     nirmatrelvir and ritonavir (PAXLOVID) 300 mg/100 mg therapy pack      4. Elevated blood pressure reading without diagnosis of hypertension  R03.0             PLAN: COVID.  See below.  Corneal abrasion.  See below  I have discussed clinical findings with patient.    Side effects of medications discussed.  Symptomatic care is discussed.  I have discussed the possibility of  worsening symptoms and indication to RTC or go to the ER if they occur.  All questions are answered, patient indicates understanding of these issues and is in agreement with plan.   Patient care instructions are discussed/given at the end of visit.   Lots of rest and fluids.    Reviewed, normal kidney function.  Denies that he is on any medications including any vitamins or supplements.  Recommend Paxlovid.  Discussed risks and/or benefits.  70% chance this will keep him out of the hospital.  Wishes to  proceed.  Follow-up with primary care provider next week if any concerns, sooner as needed.    Left eye corneal abrasion.  Antibiotic eyedrops prophylactically.  If not significantly better 48 to 72 hours is to be seen at [facility].  They take walk-ins.  Brandi Hall PA-C      SUBJECTIVE:  64-year-old male presents with positive home COVID test today.  Fever and cough and headache yesterday.  Also feels like the left eye has sand in it.  No matter.  No decreased vision.  Cases of COVID at his work.  Did have COVID in  and he states he almost .  Had to be hospitalized for at least 2 days.      No Known Allergies    No past medical history on file.    Current Outpatient Medications   Medication Sig Dispense Refill    ibuprofen (ADVIL/MOTRIN) 200 MG tablet Take 200 mg by mouth every 4 hours as needed for mild pain       No current facility-administered medications for this visit.       Social History     Tobacco Use    Smoking status: Never    Smokeless tobacco: Never   Substance Use Topics    Alcohol use: Yes       ROS:  CONSTITUTIONAL: Negative for fatigue  EYES: As above   ENT: As above.  RESP: As above.  CV: Negative for chest pains.  GI: Negative for vomiting.  MUSCULOSKELETAL:  Negative for significant muscle or joint pains.  NEUROLOGIC: Negative for headaches.  SKIN: Negative for rash.  PSYCH: Normal mentation for age.    OBJECTIVE:  BP (!) 184/79 (BP Location: Left arm, Patient Position: Sitting, Cuff Size: Adult Regular)   Pulse 87   Temp 99.1  F (37.3  C) (Oral)   Resp 18   Wt 90.8 kg (200 lb 3.2 oz)   SpO2 100%   BMI 28.73 kg/m    GENERAL APPEARANCE: Healthy, alert and no distress.  EYES:Conjunctiva/sclera clear.  Pupils equal reactive to light and accommodation.  EOMs intact  Procedure: Proparacaine and fluorescein dye placed.  Uptake along the border of the iris from 4-6 o'clock.  No obvious foreign body.  Tolerated well.  EARS: No cerumen.   Ear canals w/o erythema.  TM's intact w/o  erythema.    NOSE/MOUTH: Nose without ulcers, erythema or lesions.  SINUSES: No maxillary sinus tenderness.  THROAT: No erythema w/o tonsillar enlargement . No exudates.  NECK: Supple, nontender, no lymphadenopathy.  RESP: Lungs clear to auscultation - no rales, rhonchi or wheezes  CV: Regular rate and rhythm, normal S1 S2, no murmur noted.  NEURO: Awake, alert    SKIN: No rashes      Brandi Hall PA-C